# Patient Record
Sex: FEMALE | Employment: OTHER | URBAN - METROPOLITAN AREA
[De-identification: names, ages, dates, MRNs, and addresses within clinical notes are randomized per-mention and may not be internally consistent; named-entity substitution may affect disease eponyms.]

---

## 2021-05-03 ENCOUNTER — OFFICE VISIT (OUTPATIENT)
Dept: ENDOCRINOLOGY | Facility: CLINIC | Age: 61
End: 2021-05-03

## 2021-05-03 VITALS
HEART RATE: 97 BPM | TEMPERATURE: 97.3 F | BODY MASS INDEX: 44.35 KG/M2 | DIASTOLIC BLOOD PRESSURE: 68 MMHG | HEIGHT: 62 IN | OXYGEN SATURATION: 98 % | SYSTOLIC BLOOD PRESSURE: 124 MMHG | WEIGHT: 241 LBS

## 2021-05-03 DIAGNOSIS — E11.65 UNCONTROLLED TYPE 2 DIABETES MELLITUS WITH HYPERGLYCEMIA (HCC): Primary | ICD-10-CM

## 2021-05-03 PROCEDURE — 99204 OFFICE O/P NEW MOD 45 MIN: CPT | Performed by: INTERNAL MEDICINE

## 2021-05-03 RX ORDER — ERGOCALCIFEROL (VITAMIN D2) 10 MCG
125 TABLET ORAL DAILY
COMMUNITY

## 2021-05-03 RX ORDER — LISINOPRIL 2.5 MG/1
2.5 TABLET ORAL DAILY
COMMUNITY
Start: 2021-04-15 | End: 2022-10-11

## 2021-05-03 RX ORDER — FLUCONAZOLE 150 MG/1
TABLET ORAL
COMMUNITY
Start: 2021-02-01 | End: 2022-10-11 | Stop reason: SDUPTHER

## 2021-05-03 RX ORDER — MULTIPLE VITAMINS W/ MINERALS TAB 9MG-400MCG
1 TAB ORAL DAILY
COMMUNITY

## 2021-05-03 RX ORDER — LORATADINE 10 MG/1
10 TABLET ORAL DAILY
COMMUNITY
Start: 2021-04-15

## 2021-05-03 RX ORDER — LOVASTATIN 40 MG/1
80 TABLET ORAL
COMMUNITY
Start: 2021-04-15

## 2021-05-03 RX ORDER — GLYBURIDE 5 MG/1
TABLET ORAL
COMMUNITY
Start: 2021-04-15

## 2021-05-03 RX ORDER — VITAMIN E 268 MG
400 CAPSULE ORAL DAILY
COMMUNITY

## 2021-05-03 RX ORDER — ASPIRIN 81 MG/1
81 TABLET ORAL DAILY
COMMUNITY

## 2021-05-03 RX ORDER — SEMAGLUTIDE 1.34 MG/ML
INJECTION, SOLUTION SUBCUTANEOUS
COMMUNITY
Start: 2021-04-09 | End: 2021-05-03 | Stop reason: SDUPTHER

## 2021-05-03 RX ORDER — SEMAGLUTIDE 1.34 MG/ML
0.5 INJECTION, SOLUTION SUBCUTANEOUS WEEKLY
Qty: 1.5 ML | Refills: 5 | Status: SHIPPED | OUTPATIENT
Start: 2021-05-03 | End: 2021-11-08

## 2021-05-03 RX ORDER — EMPAGLIFLOZIN 25 MG/1
1 TABLET, FILM COATED ORAL EVERY MORNING
COMMUNITY
Start: 2021-04-15

## 2021-05-03 RX ORDER — CHLORAL HYDRATE 500 MG
2000 CAPSULE ORAL
COMMUNITY

## 2021-05-03 NOTE — PROGRESS NOTES
"     Office Note      Date: 2021  Patient Name: Farzana Clifford  MRN: [id number]  : 1960    Chief Complaint   Patient presents with   • Diabetes       History of Present Illness:   Farzana Clifford is a 61 y.o. female who presents for Diabetes - type 2.  Type 2 was diagnosed about 15- 20 years ago  She had gestational diabetes 35 years ago.  She was treated with avandia / actos.  Then metformin. MIN, sglt2, ozempic.. She had been on that combination about 5 months  bg checks are done daily.  Mid 100's  No hypos-    Last A1c:7.8      Subjective      Diabetic Complications:  Eyes: No  Kidneys: No  Feet: No  Heart: No    Diet and Exercise:  Meals per day: 4  Minutes of exercise per week: 45 mins.    Review of Systems:   Review of Systems   Constitutional: Negative.    HENT: Negative.    Eyes: Negative.    Respiratory: Negative.    Cardiovascular: Negative.    Gastrointestinal: Negative.    Endocrine: Negative.    Genitourinary: Negative.    Musculoskeletal: Negative.    Skin: Negative.    Allergic/Immunologic: Negative.    Neurological: Negative.    Hematological: Negative.    Psychiatric/Behavioral: Negative.    All other systems reviewed and are negative.      The following portions of the patient's history were reviewed and updated as appropriate: allergies, current medications, past family history, past medical history, past social history, past surgical history and problem list.    Objective     Visit Vitals  /68 (BP Location: Right arm, Patient Position: Sitting, Cuff Size: Adult)   Pulse 97   Temp 97.3 °F (36.3 °C) (Infrared)   Ht 157.5 cm (62\")   Wt 109 kg (241 lb)   SpO2 98%   BMI 44.08 kg/m²       Labs:    CMP  No results found for: GLUCOSE, BUN, CREATININE, EGFRIFNONA, EGFRIFAFRI, BCR, K, CO2, CALCIUM, PROTENTOTREF, LABIL2, BILIRUBIN, AST, ALT     CBC w/DIFF  No results found for: WBC, RBC, HGB, HCT, MCV, MCH, MCHC, RDW, RDWSD, MPV, PLT, NEUTRORELPCT, LYMPHORELPCT, MONORELPCT, EOSRELPCT, BASORELPCT, " AUTOIGPER, NEUTROABS, LYMPHSABS, MONOSABS, EOSABS, BASOSABS, AUTOIGNUM, NRBC    Physical Exam:  Physical Exam  Vitals reviewed.   Constitutional:       General: She is not in acute distress.     Appearance: Normal appearance. She is obese. She is not ill-appearing, toxic-appearing or diaphoretic.   HENT:      Head: Normocephalic and atraumatic.   Eyes:      Extraocular Movements: Extraocular movements intact.   Neck:      Comments: No goiter  Cardiovascular:      Rate and Rhythm: Normal rate and regular rhythm.   Pulmonary:      Effort: Pulmonary effort is normal. No respiratory distress.   Musculoskeletal:         General: Normal range of motion.   Lymphadenopathy:      Cervical: No cervical adenopathy.   Skin:     General: Skin is warm and dry.   Neurological:      Mental Status: She is alert and oriented to person, place, and time.   Psychiatric:         Mood and Affect: Mood normal.         Thought Content: Thought content normal.         Judgment: Judgment normal.         Assessment / Plan      Assessment & Plan:  Problem List Items Addressed This Visit        Other    Uncontrolled type 2 diabetes mellitus with hyperglycemia (CMS/[facility]) - Primary    Current Assessment & Plan      A NEW PROBLEM TO ME AS PT IS A NEW PATIENT.  SHE IS ON GOOD MEDICATIONS.  THE ONLY THING WE COULD ADD WOULD BE INSULIN WHICH WOULD LIKELY CAUSE WEIGHT GAIN.  SHE IS NOT WILLING TO CONSIDER METABOLIC SURGERY AS SHE HAD A FRIEND HAVE A BAD OUTCOME AFTER HAVING IT.  SHE NEEDS TO RESTRICT CARBS  GRAMS PER DAY- I GAVE HER THE TOOLS TO DO SOME. MOST DAYS, SHE DRINKS 6 CUPS OF MILK- THAT'S 72 GRAMS OF CARBS AND >700 CALORIES.  SHE NEEDS TO WORK ON HER EXERCISE- 150 MINUTES PER WEEK.            Relevant Medications    Jardiance 25 MG tablet    metFORMIN (GLUCOPHAGE) 1000 MG tablet    glyburide (DIAbeta) 5 MG tablet    Ozempic, 0.25 or 0.5 MG/DOSE, 2 MG/1.5ML solution pen-injector           Brock Mcdonald MD   05/03/2021

## 2021-05-03 NOTE — ASSESSMENT & PLAN NOTE
A NEW PROBLEM TO ME AS PT IS A NEW PATIENT.  SHE IS ON GOOD MEDICATIONS.  THE ONLY THING WE COULD ADD WOULD BE INSULIN WHICH WOULD LIKELY CAUSE WEIGHT GAIN.  SHE IS NOT WILLING TO CONSIDER METABOLIC SURGERY AS SHE HAD A FRIEND HAVE A BAD OUTCOME AFTER HAVING IT.  SHE NEEDS TO RESTRICT CARBS  GRAMS PER DAY- I GAVE HER THE TOOLS TO DO SOME. MOST DAYS, SHE DRINKS 6 CUPS OF MILK- THAT'S 72 GRAMS OF CARBS AND >700 CALORIES.  SHE NEEDS TO WORK ON HER EXERCISE- 150 MINUTES PER WEEK.

## 2021-11-04 ENCOUNTER — OFFICE VISIT (OUTPATIENT)
Dept: ENDOCRINOLOGY | Facility: CLINIC | Age: 61
End: 2021-11-04

## 2021-11-04 VITALS
SYSTOLIC BLOOD PRESSURE: 122 MMHG | DIASTOLIC BLOOD PRESSURE: 72 MMHG | HEIGHT: 62 IN | BODY MASS INDEX: 41 KG/M2 | WEIGHT: 222.8 LBS

## 2021-11-04 DIAGNOSIS — E11.65 UNCONTROLLED TYPE 2 DIABETES MELLITUS WITH HYPERGLYCEMIA (HCC): Primary | ICD-10-CM

## 2021-11-04 PROCEDURE — 99213 OFFICE O/P EST LOW 20 MIN: CPT | Performed by: INTERNAL MEDICINE

## 2021-11-04 NOTE — PROGRESS NOTES
"     Office Note      Date: 2021  Patient Name: Farzana Clifford  MRN: [id number]  : 1960    Chief Complaint   Patient presents with   • Diabetes     PT HAS PCP TO DO LABS BUT WILL HAVE LAB RESULTS FAXED TO US WHEN PCP DOES THEM NEXT.        History of Present Illness:   Farzana Clifford is a 61 y.o. female who presents for Diabetes - TYPE 2.   ON SFU, METFORMIN GLP1 AND SGLT 2  BG CHECKS ARE DONE DAILY AND ARE UNDER 150  SHE HAS NOT HAD HYPOGLYCEMIA     Last A1c:>8    Changes in health since last visit: NONE. Last eye exam ABOUT DUE.  SHE HAS MADE BIG CHANGES TO HER DIET AND HAS LOST 19 POUNDS     Subjective              Review of Systems:   Review of Systems   Constitutional: Negative.    HENT: Negative.    Eyes: Negative.    Respiratory: Negative.        The following portions of the patient's history were reviewed and updated as appropriate: allergies, current medications, past family history, past medical history, past social history, past surgical history and problem list.    Objective     Visit Vitals  /72 (BP Location: Left arm, Patient Position: Sitting, Cuff Size: Adult)   Ht 157.5 cm (62\")   Wt 101 kg (222 lb 12.8 oz)   BMI 40.75 kg/m²       Labs:    CMP  No results found for: GLUCOSE, BUN, CREATININE, EGFRIFNONA, EGFRIFAFRI, BCR, K, CO2, CALCIUM, PROTENTOTREF, LABIL2, BILIRUBIN, AST, ALT     CBC w/DIFF  No results found for: WBC, RBC, HGB, HCT, MCV, MCH, MCHC, RDW, RDWSD, MPV, PLT, NEUTRORELPCT, LYMPHORELPCT, MONORELPCT, EOSRELPCT, BASORELPCT, AUTOIGPER, NEUTROABS, LYMPHSABS, MONOSABS, EOSABS, BASOSABS, AUTOIGNUM, NRBC    Physical Exam:  Physical Exam  Vitals reviewed.   Constitutional:       Appearance: Normal appearance. She is normal weight.   Cardiovascular:      Pulses:           Dorsalis pedis pulses are 2+ on the right side and 2+ on the left side.        Posterior tibial pulses are 2+ on the right side and 2+ on the left side.   Musculoskeletal:      Right foot: Normal range of motion. No " deformity, bunion, Charcot foot, foot drop or prominent metatarsal heads.      Left foot: Normal range of motion. No deformity, bunion, Charcot foot, foot drop or prominent metatarsal heads.   Feet:      Right foot:      Protective Sensation: 5 sites tested. 5 sites sensed.      Skin integrity: Skin integrity normal.      Toenail Condition: Right toenails are normal.      Left foot:      Protective Sensation: 5 sites tested. 5 sites sensed.      Skin integrity: Skin integrity normal.      Toenail Condition: Left toenails are normal.      Comments: Diabetic Foot Exam Performed    Neurological:      Mental Status: She is alert.   Psychiatric:         Mood and Affect: Mood normal.         Thought Content: Thought content normal.         Judgment: Judgment normal.          Assessment / Plan      Assessment & Plan:  Problem List Items Addressed This Visit        Other    Uncontrolled type 2 diabetes mellitus with hyperglycemia (HCC) - Primary    Current Assessment & Plan     EXCELLENT IMPROVEMENT IN A1C AND WT LOSS.  WE CAN REDUCE GLYBURIDE TO 5 MG TWICE DAILY .         Relevant Medications    Jardiance 25 MG tablet    metFORMIN (GLUCOPHAGE) 1000 MG tablet    glyburide (DIAbeta) 5 MG tablet    Ozempic, 0.25 or 0.5 MG/DOSE, 2 MG/1.5ML solution pen-injector    glucose blood test strip           Brock Mcdonald MD   11/04/2021

## 2021-11-08 RX ORDER — SEMAGLUTIDE 1.34 MG/ML
INJECTION, SOLUTION SUBCUTANEOUS
Qty: 1.5 ML | Refills: 5 | Status: SHIPPED | OUTPATIENT
Start: 2021-11-08 | End: 2022-03-07

## 2022-03-04 ENCOUNTER — TELEPHONE (OUTPATIENT)
Dept: ENDOCRINOLOGY | Facility: CLINIC | Age: 62
End: 2022-03-04

## 2022-03-04 NOTE — TELEPHONE ENCOUNTER
PT CALLED REQUESTING TO CONSULT ON HER BS. SHE STATED SINCE SHE HAD COVID HER BS HAS BEEN FLUCTUATING. PLEAS REACH OUT TO HER AT EARLIEST CONVENIENCE. THANK YOU

## 2022-03-07 RX ORDER — SEMAGLUTIDE 1.34 MG/ML
1 INJECTION, SOLUTION SUBCUTANEOUS
Qty: 3 ML | Refills: 5 | Status: SHIPPED | OUTPATIENT
Start: 2022-03-07 | End: 2022-08-26

## 2022-03-07 NOTE — TELEPHONE ENCOUNTER
We can increase ozempic to 1 mg per week- will need new script for new pen to do that   Statement Selected

## 2022-03-07 NOTE — TELEPHONE ENCOUNTER
Spoke to pt-she states since having covid her blood sugars are never < 165.  She check in the morning and has been several times throughout the day.  meds are correct in med list.  Please advise

## 2022-04-11 ENCOUNTER — OFFICE VISIT (OUTPATIENT)
Dept: ENDOCRINOLOGY | Facility: CLINIC | Age: 62
End: 2022-04-11

## 2022-04-11 VITALS
WEIGHT: 229.8 LBS | DIASTOLIC BLOOD PRESSURE: 74 MMHG | HEART RATE: 92 BPM | RESPIRATION RATE: 16 BRPM | BODY MASS INDEX: 42.29 KG/M2 | OXYGEN SATURATION: 96 % | SYSTOLIC BLOOD PRESSURE: 138 MMHG | HEIGHT: 62 IN

## 2022-04-11 DIAGNOSIS — E11.65 UNCONTROLLED TYPE 2 DIABETES MELLITUS WITH HYPERGLYCEMIA: Primary | ICD-10-CM

## 2022-04-11 LAB
EXPIRATION DATE: NORMAL
GLUCOSE BLDC GLUCOMTR-MCNC: 112 MG/DL (ref 70–130)
Lab: NORMAL

## 2022-04-11 PROCEDURE — 82947 ASSAY GLUCOSE BLOOD QUANT: CPT | Performed by: INTERNAL MEDICINE

## 2022-04-11 PROCEDURE — 99214 OFFICE O/P EST MOD 30 MIN: CPT | Performed by: INTERNAL MEDICINE

## 2022-04-11 NOTE — PROGRESS NOTES
"     Office Note      Date: 2022  Patient Name: Farzana Clifford  MRN: [id number]  : 1960    Chief Complaint   Patient presents with   • Diabetes     6 month follow up diabetes        History of Present Illness:   Farzana Clifford is a 62 y.o. female who presents for Diabetes . Last A1c:<8  She fell off of her diet over the holidays . Then got with covid. She has not been exercising.  She noted hyperglycemia and had to increase her glyburide upt to 5 mg bid instead of daily   Changes in health since last visit: see above . Last eye exam up to date.    Subjective            Review of Systems:   Review of Systems   Constitutional: Negative.    HENT: Negative.    Eyes: Negative.    Respiratory: Negative.        The following portions of the patient's history were reviewed and updated as appropriate: allergies, current medications, past family history, past medical history, past social history, past surgical history and problem list.    Objective     Visit Vitals  /74 (BP Location: Left arm, Patient Position: Sitting, Cuff Size: Adult)   Pulse 92   Resp 16   Ht 157.5 cm (62\")   Wt 104 kg (229 lb 12.8 oz)   SpO2 96%   Breastfeeding No   BMI 42.03 kg/m²       Labs:    CMP  No results found for: GLUCOSE, BUN, CREATININE, EGFRIFNONA, EGFRIFAFRI, BCR, K, CO2, CALCIUM, PROTENTOTREF, LABIL2, BILIRUBIN, AST, ALT     CBC w/DIFF  No results found for: WBC, RBC, HGB, HCT, MCV, MCH, MCHC, RDW, RDWSD, MPV, PLT, NEUTRORELPCT, LYMPHORELPCT, MONORELPCT, EOSRELPCT, BASORELPCT, AUTOIGPER, NEUTROABS, LYMPHSABS, MONOSABS, EOSABS, BASOSABS, AUTOIGNUM, NRBC    Physical Exam:  Physical Exam  Vitals reviewed.   Constitutional:       Appearance: Normal appearance.   Neurological:      Mental Status: She is alert.   Psychiatric:         Mood and Affect: Mood normal.         Behavior: Behavior normal.         Thought Content: Thought content normal.         Judgment: Judgment normal.          Assessment / Plan      Assessment & " Plan:  Problem List Items Addressed This Visit        Other    Uncontrolled type 2 diabetes mellitus with hyperglycemia (HCC) - Primary    Current Assessment & Plan     Diabetes is worsening.   Continue current treatment regimen.  Diabetes will be reassessed in 6 months     She does not need more meds, she needs to be more careful with her diet and exercise .           Relevant Medications    Jardiance 25 MG tablet    metFORMIN (GLUCOPHAGE) 1000 MG tablet    glyburide (DIAbeta) 5 MG tablet    glucose blood test strip    Semaglutide, 1 MG/DOSE, (Ozempic, 1 MG/DOSE,) 4 MG/3ML solution pen-injector    Other Relevant Orders    POC Glucose, Blood (Completed)           Brock Mcdonald MD   04/11/2022

## 2022-04-11 NOTE — ASSESSMENT & PLAN NOTE
Diabetes is worsening.   Continue current treatment regimen.  Diabetes will be reassessed in 6 months     She does not need more meds, she needs to be more careful with her diet and exercise .

## 2022-08-26 RX ORDER — SEMAGLUTIDE 1.34 MG/ML
INJECTION, SOLUTION SUBCUTANEOUS
Qty: 3 ML | Refills: 0 | Status: SHIPPED | OUTPATIENT
Start: 2022-08-26 | End: 2022-10-11 | Stop reason: SDUPTHER

## 2022-08-26 NOTE — TELEPHONE ENCOUNTER
Nov 10/11/22  lov 11/4/21    The patient had a appointment that was cancelled by the provider 5/4/22

## 2022-10-11 ENCOUNTER — OFFICE VISIT (OUTPATIENT)
Dept: ENDOCRINOLOGY | Facility: CLINIC | Age: 62
End: 2022-10-11

## 2022-10-11 VITALS
DIASTOLIC BLOOD PRESSURE: 70 MMHG | SYSTOLIC BLOOD PRESSURE: 136 MMHG | BODY MASS INDEX: 40.85 KG/M2 | HEART RATE: 77 BPM | OXYGEN SATURATION: 97 % | HEIGHT: 62 IN | WEIGHT: 222 LBS

## 2022-10-11 DIAGNOSIS — E11.65 UNCONTROLLED TYPE 2 DIABETES MELLITUS WITH HYPERGLYCEMIA: Primary | ICD-10-CM

## 2022-10-11 LAB
EXPIRATION DATE: ABNORMAL
GLUCOSE BLDC GLUCOMTR-MCNC: 279 MG/DL (ref 70–130)
Lab: ABNORMAL

## 2022-10-11 PROCEDURE — 82947 ASSAY GLUCOSE BLOOD QUANT: CPT | Performed by: INTERNAL MEDICINE

## 2022-10-11 PROCEDURE — 99213 OFFICE O/P EST LOW 20 MIN: CPT | Performed by: INTERNAL MEDICINE

## 2022-10-11 RX ORDER — LISINOPRIL 5 MG/1
TABLET ORAL
COMMUNITY
Start: 2022-10-10

## 2022-10-11 RX ORDER — DICYCLOMINE HCL 20 MG
20 TABLET ORAL 3 TIMES DAILY
COMMUNITY
Start: 2022-09-16

## 2022-10-11 RX ORDER — FLUCONAZOLE 150 MG/1
150 TABLET ORAL ONCE
Qty: 1 TABLET | Refills: 0 | Status: SHIPPED | OUTPATIENT
Start: 2022-10-11 | End: 2022-10-11

## 2022-10-11 RX ORDER — SEMAGLUTIDE 1.34 MG/ML
1 INJECTION, SOLUTION SUBCUTANEOUS WEEKLY
Qty: 3 ML | Refills: 5 | Status: SHIPPED | OUTPATIENT
Start: 2022-10-11 | End: 2023-03-23

## 2022-10-11 NOTE — ASSESSMENT & PLAN NOTE
Improved/  Ideally a1c would be less than 7 but 7.6 is not terrible. The plan now is to sit tight but when it is available we will increase ozempic to 2 mg per week

## 2022-10-11 NOTE — PROGRESS NOTES
"     Office Note      Date: 10/11/2022  Patient Name: Farzana Clifford  MRN: [id number]  : 1960    Chief Complaint   Patient presents with   • Diabetes       History of Present Illness:   Farzana Clifford is a 62 y.o. female who presents for Diabetes - TYPE 2  RX: GLYBURIDE/JARDIANCE, METFORMIN, OZEMPIC     Last A1c:8.1 IN MARCH  7.6 in september  Changes in health since last visit: none . Last eye exam up to date    Ran out of ozempic . Last month . Since then her sugars are high .    Subjective              Review of Systems:   Review of Systems   Constitutional: Negative for unexpected weight change.   HENT: Negative.    Eyes: Negative.    Respiratory: Negative.        The following portions of the patient's history were reviewed and updated as appropriate: allergies, current medications, past family history, past medical history, past social history, past surgical history and problem list.    Objective     Visit Vitals  /70   Pulse 77   Ht 157.5 cm (62\")   Wt 101 kg (222 lb)   SpO2 97%   BMI 40.60 kg/m²       Labs:    CMP  No results found for: GLUCOSE, BUN, CREATININE, EGFRIFNONA, EGFRIFAFRI, BCR, K, CO2, CALCIUM, PROTENTOTREF, LABIL2, BILIRUBIN, AST, ALT     CBC w/DIFF  No results found for: WBC, RBC, HGB, HCT, MCV, MCH, MCHC, RDW, RDWSD, MPV, PLT, NEUTRORELPCT, LYMPHORELPCT, MONORELPCT, EOSRELPCT, BASORELPCT, AUTOIGPER, NEUTROABS, LYMPHSABS, MONOSABS, EOSABS, BASOSABS, AUTOIGNUM, NRBC    Physical Exam:  Physical Exam  Vitals reviewed.   Constitutional:       Appearance: Normal appearance. She is normal weight.   Cardiovascular:      Pulses:           Dorsalis pedis pulses are 2+ on the right side and 2+ on the left side.        Posterior tibial pulses are 2+ on the right side and 2+ on the left side.   Musculoskeletal:      Right foot: Normal range of motion. No deformity, bunion, Charcot foot, foot drop or prominent metatarsal heads.      Left foot: Normal range of motion. No deformity, bunion, Charcot " foot, foot drop or prominent metatarsal heads.   Feet:      Right foot:      Protective Sensation: 5 sites tested. 5 sites sensed.      Skin integrity: Skin integrity normal.      Toenail Condition: Right toenails are normal.      Left foot:      Protective Sensation: 5 sites tested. 5 sites sensed.      Skin integrity: Skin integrity normal.      Toenail Condition: Left toenails are normal.      Comments: Diabetic Foot Exam Performed and Monofilament Test Performed    Neurological:      Mental Status: She is alert.   Psychiatric:         Mood and Affect: Mood normal.         Thought Content: Thought content normal.          Assessment / Plan      Assessment & Plan:  Problem List Items Addressed This Visit        Other    Uncontrolled type 2 diabetes mellitus with hyperglycemia (HCC) - Primary    Current Assessment & Plan     Improved/  Ideally a1c would be less than 7 but 7.6 is not terrible. The plan now is to sit tight but when it is available we will increase ozempic to 2 mg per week          Relevant Medications    Jardiance 25 MG tablet    metFORMIN (GLUCOPHAGE) 1000 MG tablet    glyburide (DIAbeta) 5 MG tablet    glucose blood test strip    Semaglutide, 1 MG/DOSE, (Ozempic, 1 MG/DOSE,) 4 MG/3ML solution pen-injector    Other Relevant Orders    POC Glucose, Blood (Completed)        Brock Mcdnoald MD   10/11/2022

## 2023-01-20 ENCOUNTER — TELEPHONE (OUTPATIENT)
Dept: ENDOCRINOLOGY | Facility: CLINIC | Age: 63
End: 2023-01-20
Payer: COMMERCIAL

## 2023-01-20 DIAGNOSIS — E11.65 UNCONTROLLED TYPE 2 DIABETES MELLITUS WITH HYPERGLYCEMIA: ICD-10-CM

## 2023-01-20 NOTE — TELEPHONE ENCOUNTER
PT CALLED REQUESTING TEST STRIPS TO BE SENT IN TO [facility] IN [place], [place]. SHE WAS UNSURE WHAT BRAND SHE USES.

## 2023-03-23 RX ORDER — SEMAGLUTIDE 1.34 MG/ML
INJECTION, SOLUTION SUBCUTANEOUS
Qty: 3 ML | Refills: 0 | Status: SHIPPED | OUTPATIENT
Start: 2023-03-23

## 2023-04-18 ENCOUNTER — OFFICE VISIT (OUTPATIENT)
Dept: ENDOCRINOLOGY | Facility: CLINIC | Age: 63
End: 2023-04-18
Payer: COMMERCIAL

## 2023-04-18 VITALS
WEIGHT: 221.4 LBS | SYSTOLIC BLOOD PRESSURE: 130 MMHG | DIASTOLIC BLOOD PRESSURE: 68 MMHG | OXYGEN SATURATION: 97 % | HEART RATE: 82 BPM | TEMPERATURE: 96.8 F | HEIGHT: 62 IN | BODY MASS INDEX: 40.74 KG/M2 | RESPIRATION RATE: 21 BRPM

## 2023-04-18 DIAGNOSIS — E11.65 UNCONTROLLED TYPE 2 DIABETES MELLITUS WITH HYPERGLYCEMIA: Primary | ICD-10-CM

## 2023-04-18 LAB
EXPIRATION DATE: NORMAL
GLUCOSE BLDC GLUCOMTR-MCNC: 146 MG/DL (ref 70–130)
HBA1C MFR BLD: 7.3 %
Lab: NORMAL

## 2023-04-18 PROCEDURE — 83036 HEMOGLOBIN GLYCOSYLATED A1C: CPT | Performed by: INTERNAL MEDICINE

## 2023-04-18 PROCEDURE — 82947 ASSAY GLUCOSE BLOOD QUANT: CPT | Performed by: INTERNAL MEDICINE

## 2023-04-18 PROCEDURE — 99214 OFFICE O/P EST MOD 30 MIN: CPT | Performed by: INTERNAL MEDICINE

## 2023-04-18 RX ORDER — LOSARTAN POTASSIUM 50 MG/1
1 TABLET ORAL EVERY 12 HOURS SCHEDULED
COMMUNITY
Start: 2023-03-15

## 2023-04-18 RX ORDER — LOVASTATIN 40 MG/1
80 TABLET ORAL DAILY
COMMUNITY
Start: 2022-12-28

## 2023-04-18 RX ORDER — FLUCONAZOLE 150 MG/1
TABLET ORAL
COMMUNITY
Start: 2023-03-27

## 2023-04-18 RX ORDER — NYSTATIN 100000 [USP'U]/G
POWDER TOPICAL
COMMUNITY
Start: 2023-03-27

## 2023-04-18 RX ORDER — SEMAGLUTIDE 2.68 MG/ML
2 INJECTION, SOLUTION SUBCUTANEOUS WEEKLY
Qty: 3 ML | Refills: 5 | Status: SHIPPED | OUTPATIENT
Start: 2023-04-18

## 2023-04-18 NOTE — ASSESSMENT & PLAN NOTE
a1c improved but not at goal  The plan is to increase the ozempic and then if she has too many lows, will reduce the sfu or stop jardiance since  She is having gmi so often

## 2023-04-18 NOTE — PROGRESS NOTES
"     Office Note      Date: 2023  Patient Name: Farzana Clifford  MRN: [id number]  : 1960    Chief Complaint   Patient presents with   • Diabetes     6mo fu, T2DM       History of Present Illness:   Farzana Clifford is a 63 y.o. female who presents for Diabetes type 2.   Current RX ozempic/glyburide/ jardiance/ metformin     Bg checks are done:daily   Hypoglycemia :none       Last A1c:  Hemoglobin A1C   Date Value Ref Range Status   2023 7.3 % Final       Changes in health since last visit: none . Last eye exam up to date  Ariane, foot check and lipids are up to date    Has been diagnosed with htn and is on losartan now.  Has been diagnosed with fatty liver  .    Subjective              Review of Systems:   Review of Systems   Constitutional: Negative.    HENT: Negative.    Eyes: Negative.    Respiratory: Negative.        The following portions of the patient's history were reviewed and updated as appropriate: allergies, current medications, past family history, past medical history, past social history, past surgical history and problem list.    Objective     Visit Vitals  /68   Pulse 82   Temp 96.8 °F (36 °C) (Infrared)   Resp 21   Ht 157.5 cm (62.01\")   Wt 100 kg (221 lb 6.4 oz)   SpO2 97%   BMI 40.48 kg/m²           Physical Exam:  Physical Exam  Vitals reviewed.   Constitutional:       Appearance: Normal appearance.   Neurological:      Mental Status: She is alert.   Psychiatric:         Mood and Affect: Mood normal.         Behavior: Behavior normal.         Thought Content: Thought content normal.         Judgment: Judgment normal.          Assessment / Plan      Assessment & Plan:  Problem List Items Addressed This Visit        Other    Uncontrolled type 2 diabetes mellitus with hyperglycemia - Primary    Current Assessment & Plan     a1c improved but not at goal  The plan is to increase the ozempic and then if she has too many lows, will reduce the sfu or stop jardiance since  She is having gmi " so often          Relevant Medications    Jardiance 25 MG tablet    metFORMIN (GLUCOPHAGE) 1000 MG tablet    glyburide (DIAbeta) 5 MG tablet    glucose blood test strip    Semaglutide, 2 MG/DOSE, (Ozempic, 2 MG/DOSE,) 8 MG/3ML solution pen-injector    Other Relevant Orders    POC Glucose, Blood (Completed)    POC Glycosylated Hemoglobin (Hb A1C) (Completed)        Brock Mcdonald MD   04/18/2023

## 2023-09-25 DIAGNOSIS — E11.65 UNCONTROLLED TYPE 2 DIABETES MELLITUS WITH HYPERGLYCEMIA: ICD-10-CM

## 2023-09-25 RX ORDER — SEMAGLUTIDE 2.68 MG/ML
INJECTION, SOLUTION SUBCUTANEOUS
Qty: 9 ML | Refills: 0 | Status: SHIPPED | OUTPATIENT
Start: 2023-09-25

## 2023-09-25 NOTE — TELEPHONE ENCOUNTER
Rx Refill Note  Requested Prescriptions     Pending Prescriptions Disp Refills    Ozempic, 2 MG/DOSE, 8 MG/3ML solution pen-injector [Pharmacy Med Name: Ozempic (2 MG/DOSE) 8 MG/3ML Subcutaneous Solution Pen-injector] 9 mL 0     Sig: INJECT 2 MG SUBCUTANEOUSLY (UNDER THE SKIN INTO THE APPRIPRIATE AREA AS DIRECTED) ONCE A WEEK      Last office visit with prescribing clinician: 4/18/2023   Last telemedicine visit with prescribing clinician: Visit date not found   Next office visit with prescribing clinician: 10/18/2023                         Would you like a call back once the refill request has been completed: [] Yes [] No    If the office needs to give you a call back, can they leave a voicemail: [] Yes [] No    Eladio Ayala MA  09/25/23, 10:20 EDT

## 2023-10-20 ENCOUNTER — OFFICE VISIT (OUTPATIENT)
Dept: ENDOCRINOLOGY | Facility: CLINIC | Age: 63
End: 2023-10-20
Payer: COMMERCIAL

## 2023-10-20 VITALS
SYSTOLIC BLOOD PRESSURE: 136 MMHG | BODY MASS INDEX: 38.64 KG/M2 | WEIGHT: 210 LBS | DIASTOLIC BLOOD PRESSURE: 72 MMHG | HEIGHT: 62 IN | HEART RATE: 97 BPM | OXYGEN SATURATION: 96 %

## 2023-10-20 DIAGNOSIS — E11.65 UNCONTROLLED TYPE 2 DIABETES MELLITUS WITH HYPERGLYCEMIA: Primary | ICD-10-CM

## 2023-10-20 LAB
EXPIRATION DATE: ABNORMAL
EXPIRATION DATE: ABNORMAL
GLUCOSE BLDC GLUCOMTR-MCNC: 149 MG/DL (ref 70–130)
HBA1C MFR BLD: 7 % (ref 4.5–5.7)
Lab: ABNORMAL
Lab: ABNORMAL

## 2023-10-20 RX ORDER — EZETIMIBE 10 MG/1
1 TABLET ORAL DAILY
COMMUNITY
Start: 2023-09-25

## 2023-10-20 NOTE — PROGRESS NOTES
"     Office Note      Date: 10/20/2023  Patient Name: Farzana Clifford  MRN: [id number]  : 1960    Chief Complaint   Patient presents with    Diabetes       History of Present Illness:   Farzana Clifford is a 63 y.o. female who presents for Diabetes type 2.   Current RX ozempic/ glyburide/jardiance/ metformin     Bg checks are done:daily   Hypoglycemia :occasional       Last A1c:  Hemoglobin A1C   Date Value Ref Range Status   10/20/2023 7.0 (A) 4.5 - 5.7 % Final       Changes in health since last visit: none . Last eye exam up to date .    Subjective              Review of Systems:   Review of Systems   Constitutional: Negative.    HENT: Negative.     Eyes: Negative.    Respiratory: Negative.         The following portions of the patient's history were reviewed and updated as appropriate: allergies, current medications, past family history, past medical history, past social history, past surgical history, and problem list.    Objective     Visit Vitals  /72   Pulse 97   Ht 157.5 cm (62\")   Wt 95.3 kg (210 lb)   SpO2 96%   BMI 38.41 kg/m²           Physical Exam:  Physical Exam  Vitals reviewed.   Constitutional:       Appearance: Normal appearance. She is normal weight.   Cardiovascular:      Pulses:           Dorsalis pedis pulses are 2+ on the right side and 2+ on the left side.        Posterior tibial pulses are 2+ on the right side and 2+ on the left side.   Musculoskeletal:      Right foot: Normal range of motion. No deformity, bunion, Charcot foot, foot drop or prominent metatarsal heads.      Left foot: Normal range of motion. No deformity, bunion, Charcot foot, foot drop or prominent metatarsal heads.   Feet:      Right foot:      Protective Sensation: 10 sites tested.  10 sites sensed.      Skin integrity: Skin integrity normal.      Toenail Condition: Right toenails are normal.      Left foot:      Protective Sensation: 10 sites tested.  10 sites sensed.      Skin integrity: Skin integrity normal.      " Toenail Condition: Left toenails are normal.      Comments: Diabetic Foot Exam Performed    Neurological:      Mental Status: She is alert.   Psychiatric:         Mood and Affect: Mood normal.         Behavior: Behavior normal.         Thought Content: Thought content normal.         Judgment: Judgment normal.          Assessment / Plan      Assessment & Plan:  Problem List Items Addressed This Visit          Other    Uncontrolled type 2 diabetes mellitus with hyperglycemia - Primary    Current Assessment & Plan     A1c is improved. The plan is to stop the glyburide due to side effect of hypoglycemia.  When she has but a month of ozempic left ,she will call and we will switcht to mounjaro 2.5 mg per week          Relevant Medications    Jardiance 25 MG tablet    metFORMIN (GLUCOPHAGE) 1000 MG tablet    glucose blood test strip    Ozempic, 2 MG/DOSE, 8 MG/3ML solution pen-injector    Other Relevant Orders    POC Glucose, Blood (Completed)    POC Glycosylated Hemoglobin (Hb A1C) (Completed)        Brock Mcdonald MD   10/20/2023

## 2023-10-20 NOTE — ASSESSMENT & PLAN NOTE
A1c is improved. The plan is to stop the glyburide due to side effect of hypoglycemia.  When she has but a month of ozempic left ,she will call and we will switcht to mounjaro 2.5 mg per week

## 2023-12-11 ENCOUNTER — TELEPHONE (OUTPATIENT)
Dept: ENDOCRINOLOGY | Facility: CLINIC | Age: 63
End: 2023-12-11

## 2023-12-11 NOTE — TELEPHONE ENCOUNTER
Caller: Farzana Clifford    Relationship: Self    Best call back number: [phone]      What is the best time to reach you:   Who are you requesting to speak with (clinical staff, provider,  specific staff member): CLINICAL      What was the call regarding: PATIENT HAS 1 MORE SHOT OF OZEMPIC LEFT THAT SHE WILL TAKE ON THIS SAT AND NOW SHE IS NEEDING TO HAVE MONJURO FILLED.

## 2024-01-08 ENCOUNTER — TELEPHONE (OUTPATIENT)
Dept: ENDOCRINOLOGY | Facility: CLINIC | Age: 64
End: 2024-01-08
Payer: COMMERCIAL

## 2024-01-08 DIAGNOSIS — E11.65 UNCONTROLLED TYPE 2 DIABETES MELLITUS WITH HYPERGLYCEMIA: Primary | ICD-10-CM

## 2024-01-08 NOTE — TELEPHONE ENCOUNTER
PT CALLED REQUESTING MOUNJARO RX WITH INCREASED DOSE TO BE SENT IN TO [facility] IN [place], [place].

## 2024-02-06 DIAGNOSIS — E11.65 UNCONTROLLED TYPE 2 DIABETES MELLITUS WITH HYPERGLYCEMIA: ICD-10-CM

## 2024-02-06 NOTE — TELEPHONE ENCOUNTER
Caller: Farzana Clifford    Relationship: Self    Best call back number: [phone]     Requested Prescriptions:   Tirzepatide (MOUNJARO) 5 MG/0.5ML solution pen-injector pen  5 mg, Weekly        Pharmacy where request should be sent:  [facility] [address] [place] [address] [phone] [place] [phone]  [phone]     Last office visit with prescribing clinician: 10/20/2023   Last telemedicine visit with prescribing clinician: Visit date not found   Next office visit with prescribing clinician: 4/23/2024     Additional details provided by patient: THE PT DOESN'T HAVE TO TAKE ANOTHER SHOT UNTIL THURSDAY. PT IS CURRENTLY OUT OF HER MUNJARO    Does the patient have less than a 3 day supply:  [x] Yes  [] No    Would you like a call back once the refill request has been completed: [] Yes [] No    If the office needs to give you a call back, can they leave a voicemail: [] Yes [] No    Adolfo Lucero Rep   02/06/24 13:36 EST

## 2024-03-28 DIAGNOSIS — E11.65 UNCONTROLLED TYPE 2 DIABETES MELLITUS WITH HYPERGLYCEMIA: ICD-10-CM

## 2024-04-01 NOTE — TELEPHONE ENCOUNTER
Patient called office, stated that she was returning office call. She said that she needs the next dose increase of mounjaro. She would like this sent to [facility] pharmacy in [place].

## 2024-04-02 RX ORDER — TIRZEPATIDE 5 MG/.5ML
INJECTION, SOLUTION SUBCUTANEOUS
Qty: 4 ML | Refills: 0 | OUTPATIENT
Start: 2024-04-02

## 2024-04-23 ENCOUNTER — OFFICE VISIT (OUTPATIENT)
Dept: ENDOCRINOLOGY | Facility: CLINIC | Age: 64
End: 2024-04-23
Payer: COMMERCIAL

## 2024-04-23 VITALS
BODY MASS INDEX: 34.04 KG/M2 | SYSTOLIC BLOOD PRESSURE: 128 MMHG | DIASTOLIC BLOOD PRESSURE: 68 MMHG | OXYGEN SATURATION: 98 % | WEIGHT: 185 LBS | HEIGHT: 62 IN | HEART RATE: 67 BPM

## 2024-04-23 DIAGNOSIS — E11.65 UNCONTROLLED TYPE 2 DIABETES MELLITUS WITH HYPERGLYCEMIA: Primary | ICD-10-CM

## 2024-04-23 LAB
EXPIRATION DATE: ABNORMAL
EXPIRATION DATE: ABNORMAL
GLUCOSE BLDC GLUCOMTR-MCNC: 135 MG/DL (ref 70–130)
HBA1C MFR BLD: 7.3 % (ref 4.5–5.7)
Lab: ABNORMAL
Lab: ABNORMAL

## 2024-04-23 NOTE — PROGRESS NOTES
"     Office Note      Date: 2024  Patient Name: Farzana Clifford  MRN: [id number]  : 1960    Chief Complaint   Patient presents with    Diabetes     Type II       History of Present Illness:   Farzana Clifford is a 64 y.o. female who presents for Diabetes type 2.   Current RX mounjaro/ jardiance and metformin.    She had to take an occasional sfu  while she was building up the dose of mounjaro     Bg checks are done: couple times per day   Hypoglycemia :none       Last A1c:  Hemoglobin A1C   Date Value Ref Range Status   2024 7.3 (A) 4.5 - 5.7 % Final       Changes in health since last visit: her cholesterol medications were changed. . Last eye exam up to date    Her calcium was high when it was checked but then it was normal when rechecked she says  She sees  podiatry tomorrow. She injured her left great toe .    Subjective            Review of Systems:   Review of Systems   Musculoskeletal:  Positive for gait problem.       The following portions of the patient's history were reviewed and updated as appropriate: allergies, current medications, past family history, past medical history, past social history, past surgical history, and problem list.    Objective     Visit Vitals  /68 (BP Location: Left arm, Patient Position: Sitting, Cuff Size: Adult)   Pulse 67   Ht 157.5 cm (62\")   Wt 83.9 kg (185 lb)   SpO2 98%   BMI 33.84 kg/m²           Physical Exam:  Physical Exam  Vitals reviewed.   Constitutional:       Appearance: Normal appearance.   Musculoskeletal:        Feet:    Feet:      Comments: Swollen, red left little toe (due to trauma )  Neurological:      Mental Status: She is alert.          Assessment / Plan      Assessment & Plan:  Problem List Items Addressed This Visit       Uncontrolled type 2 diabetes mellitus with hyperglycemia - Primary    Current Assessment & Plan     A1c 7.3   It was 8.6 last time with pcp. This is good progress.   Stay the course          Relevant Medications    " Jardiance 25 MG tablet    metFORMIN (GLUCOPHAGE) 1000 MG tablet    glucose blood test strip    Tirzepatide (Mounjaro) 7.5 MG/0.5ML solution pen-injector pen    Other Relevant Orders    POC Glucose, Blood (Completed)    POC Glycosylated Hemoglobin (Hb A1C) (Completed)         Electronically signed by : Brock Mcdonald MD  04/23/2024

## 2024-05-07 ENCOUNTER — PRIOR AUTHORIZATION (OUTPATIENT)
Dept: ENDOCRINOLOGY | Facility: CLINIC | Age: 64
End: 2024-05-07
Payer: COMMERCIAL

## 2024-10-11 RX ORDER — TIRZEPATIDE 7.5 MG/.5ML
INJECTION, SOLUTION SUBCUTANEOUS
Qty: 4 ML | Refills: 0 | Status: SHIPPED | OUTPATIENT
Start: 2024-10-11

## 2024-10-11 NOTE — TELEPHONE ENCOUNTER
Rx Refill Note  Requested Prescriptions     Pending Prescriptions Disp Refills    Mounjaro 7.5 MG/0.5ML solution pen-injector pen [Pharmacy Med Name: Mounjaro 7.5 MG/0.5ML Subcutaneous Solution Pen-injector] 4 mL 0     Sig: INJECT 1 DOSE SUBCUTANEOUSLY ONCE A WEEK          Last office visit with prescribing clinician: 4/23/2024     Next office visit with prescribing clinician: 10/25/2024         Marcela Hicks MA  10/11/24, 08:59 EDT

## 2024-10-25 ENCOUNTER — OFFICE VISIT (OUTPATIENT)
Age: 64
End: 2024-10-25
Payer: COMMERCIAL

## 2024-10-25 VITALS
DIASTOLIC BLOOD PRESSURE: 80 MMHG | OXYGEN SATURATION: 100 % | HEIGHT: 62 IN | WEIGHT: 170 LBS | SYSTOLIC BLOOD PRESSURE: 126 MMHG | BODY MASS INDEX: 31.28 KG/M2 | HEART RATE: 85 BPM

## 2024-10-25 DIAGNOSIS — E11.65 UNCONTROLLED TYPE 2 DIABETES MELLITUS WITH HYPERGLYCEMIA: Primary | ICD-10-CM

## 2024-10-25 LAB
EXPIRATION DATE: ABNORMAL
EXPIRATION DATE: ABNORMAL
GLUCOSE BLDC GLUCOMTR-MCNC: 141 MG/DL (ref 70–130)
HBA1C MFR BLD: 7.1 % (ref 4.5–5.7)
Lab: ABNORMAL
Lab: ABNORMAL

## 2024-10-25 NOTE — PROGRESS NOTES
"     Office Note      Date: 10/25/2024  Patient Name: Farzana Clifford  MRN: [id number]  : 1960    Chief Complaint   Patient presents with    Diabetes       History of Present Illness:   Farzana Clifford is a 64 y.o. female who presents for Diabetes type 2.   Current RX mounjaro/ met and jardiance     Bg checks are done:daily   Hypoglycemia :none       Last A1c:  Hemoglobin A1C   Date Value Ref Range Status   10/25/2024 7.1 (A) 4.5 - 5.7 % Final       Changes in health since last visit: last time I told her I thought her toe was broken. She went and it was broken  . It was taken care of . Last eye exam up to date.    Subjective            Review of Systems:   Review of Systems   Endocrine: Negative for polydipsia and polyuria.       The following portions of the patient's history were reviewed and updated as appropriate: allergies, current medications, past family history, past medical history, past social history, past surgical history, and problem list.    Objective     Visit Vitals  /80 (BP Location: Left arm, Patient Position: Sitting, Cuff Size: Adult)   Pulse 85   Ht 157.5 cm (62\")   Wt 77.1 kg (170 lb)   SpO2 100%   BMI 31.09 kg/m²           Physical Exam:  Physical Exam  Vitals reviewed.   Constitutional:       Appearance: Normal appearance.   Neurological:      Mental Status: She is alert.   Psychiatric:         Mood and Affect: Mood normal.         Behavior: Behavior normal.         Thought Content: Thought content normal.         Judgment: Judgment normal.          Assessment / Plan      Assessment & Plan:  Problem List Items Addressed This Visit       Uncontrolled type 2 diabetes mellitus with hyperglycemia - Primary    Current Assessment & Plan     Improved at 7.1  Plan : no chagnes         Relevant Medications    Jardiance 25 MG tablet    metFORMIN (GLUCOPHAGE) 1000 MG tablet    glucose blood test strip    Tirzepatide (Mounjaro) 7.5 MG/0.5ML solution pen-injector pen    Other Relevant Orders    POC " Glucose, Blood (Completed)    POC Glycosylated Hemoglobin (Hb A1C) (Completed)         Electronically signed by : Brock Mcdonald MD  10/25/2024

## 2025-04-28 NOTE — TELEPHONE ENCOUNTER
Rx Refill Note  Requested Prescriptions     Pending Prescriptions Disp Refills    Tirzepatide 7.5 MG/0.5ML solution auto-injector 6 mL 3     Sig: Inject 7.5 mg under the skin into the appropriate area as directed 1 (One) Time Per Week.      Last office visit with prescribing clinician: 10/25/2024      Next office visit with prescribing clinician: 10/31/2025       Kenyatta Erwin MA  04/28/25, 09:48 EDT

## 2025-04-28 NOTE — TELEPHONE ENCOUNTER
Rx Refill Note  Requested Prescriptions     Pending Prescriptions Disp Refills    Tirzepatide 7.5 MG/0.5ML solution auto-injector 2 mL 1     Sig: Inject 0.5 mL under the skin into the appropriate area as directed 1 (One) Time Per Week.     Refused Prescriptions Disp Refills    Tirzepatide 7.5 MG/0.5ML solution auto-injector 6 mL 3     Sig: Inject 7.5 mg under the skin into the appropriate area as directed 1 (One) Time Per Week.     Refused By: EDWIGE RUTHERFORD     Reason for Refusal: Other      Last office visit with prescribing clinician: 10/25/2024      Next office visit with prescribing clinician: 10/31/2025       Kenyatta Erwin MA  04/28/25, 10:47 EDT